# Patient Record
Sex: MALE | Race: WHITE | NOT HISPANIC OR LATINO | URBAN - NONMETROPOLITAN AREA
[De-identification: names, ages, dates, MRNs, and addresses within clinical notes are randomized per-mention and may not be internally consistent; named-entity substitution may affect disease eponyms.]

---

## 2017-02-06 ENCOUNTER — FOLLOW UP ESTABLISHED (OUTPATIENT)
Dept: URBAN - NONMETROPOLITAN AREA CLINIC 13 | Facility: CLINIC | Age: 67
End: 2017-02-06
Payer: COMMERCIAL

## 2017-02-06 DIAGNOSIS — H02.834 DERMATOCHALASIS OF LEFT UPPER LID: ICD-10-CM

## 2017-02-06 DIAGNOSIS — H40.1132 PRIMARY OPEN-ANGLE GLAUCOMA, MODERATE STAGE, BILATERAL: Primary | ICD-10-CM

## 2017-02-06 DIAGNOSIS — H16.223 KERATOCONJUNCT SICCA, NOT SPECIFIED AS SJOGREN'S, BILATERAL: ICD-10-CM

## 2017-02-06 DIAGNOSIS — H02.831 DERMATOCHALASIS OF RIGHT UPPER LID: ICD-10-CM

## 2017-02-06 PROCEDURE — 99213 OFFICE O/P EST LOW 20 MIN: CPT | Performed by: OPTOMETRIST

## 2017-02-06 PROCEDURE — 92134 CPTRZ OPH DX IMG PST SGM RTA: CPT | Performed by: OPTOMETRIST

## 2017-02-06 ASSESSMENT — INTRAOCULAR PRESSURE
OS: 11
OD: 12

## 2017-02-10 ENCOUNTER — FOLLOW UP ESTABLISHED (OUTPATIENT)
Dept: URBAN - NONMETROPOLITAN AREA CLINIC 13 | Facility: CLINIC | Age: 67
End: 2017-02-10
Payer: COMMERCIAL

## 2017-02-10 PROCEDURE — 99213 OFFICE O/P EST LOW 20 MIN: CPT | Performed by: OPTOMETRIST

## 2017-02-10 PROCEDURE — 92083 EXTENDED VISUAL FIELD XM: CPT | Performed by: OPTOMETRIST

## 2017-02-10 ASSESSMENT — INTRAOCULAR PRESSURE
OD: 13
OS: 13

## 2017-02-22 ENCOUNTER — FOLLOW UP ESTABLISHED (OUTPATIENT)
Dept: URBAN - NONMETROPOLITAN AREA CLINIC 13 | Facility: CLINIC | Age: 67
End: 2017-02-22
Payer: COMMERCIAL

## 2017-02-22 DIAGNOSIS — H35.372 PUCKERING OF MACULA, LEFT EYE: Primary | ICD-10-CM

## 2017-02-22 PROCEDURE — 92134 CPTRZ OPH DX IMG PST SGM RTA: CPT | Performed by: OPHTHALMOLOGY

## 2017-02-22 PROCEDURE — 92014 COMPRE OPH EXAM EST PT 1/>: CPT | Performed by: OPHTHALMOLOGY

## 2017-02-22 ASSESSMENT — INTRAOCULAR PRESSURE
OD: 12
OS: 13

## 2017-04-03 ENCOUNTER — Encounter (OUTPATIENT)
Dept: URBAN - NONMETROPOLITAN AREA CLINIC 13 | Facility: CLINIC | Age: 67
End: 2017-04-03
Payer: COMMERCIAL

## 2017-04-03 PROCEDURE — 99213 OFFICE O/P EST LOW 20 MIN: CPT | Performed by: PHYSICIAN ASSISTANT

## 2017-04-11 ENCOUNTER — SURGERY (OUTPATIENT)
Dept: URBAN - NONMETROPOLITAN AREA SURGERY 4 | Facility: SURGERY | Age: 67
End: 2017-04-11
Payer: COMMERCIAL

## 2017-04-11 PROCEDURE — 66820 INCISION SECONDARY CATARACT: CPT | Performed by: OPHTHALMOLOGY

## 2017-04-11 PROCEDURE — 67042 VIT FOR MACULAR HOLE: CPT | Performed by: OPHTHALMOLOGY

## 2017-04-11 RX ORDER — HYDROCODONE BITARTRATE AND ACETAMINOPHEN 5; 300 MG/1; MG/1
TABLET ORAL
Qty: 10 | Refills: 0 | Status: ACTIVE
Start: 2017-04-11

## 2017-04-11 RX ORDER — OFLOXACIN 3 MG/ML
0.3 % SOLUTION/ DROPS OPHTHALMIC
Qty: 1 | Refills: 0 | Status: INACTIVE
Start: 2017-04-11 | End: 2017-06-02

## 2017-04-11 RX ORDER — PREDNISOLONE ACETATE 10 MG/ML
1 % SUSPENSION/ DROPS OPHTHALMIC
Qty: 1 | Refills: 0 | Status: INACTIVE
Start: 2017-04-11 | End: 2017-06-02

## 2017-04-12 ENCOUNTER — FOLLOW UP ESTABLISHED (OUTPATIENT)
Dept: URBAN - NONMETROPOLITAN AREA CLINIC 13 | Facility: CLINIC | Age: 67
End: 2017-04-12

## 2017-04-12 PROCEDURE — 99024 POSTOP FOLLOW-UP VISIT: CPT | Performed by: OPTOMETRIST

## 2017-04-12 ASSESSMENT — INTRAOCULAR PRESSURE
OS: 15
OD: 10

## 2017-04-19 ENCOUNTER — POST OP (OUTPATIENT)
Dept: URBAN - NONMETROPOLITAN AREA CLINIC 13 | Facility: CLINIC | Age: 67
End: 2017-04-19
Payer: COMMERCIAL

## 2017-04-19 PROCEDURE — 92134 CPTRZ OPH DX IMG PST SGM RTA: CPT | Performed by: OPHTHALMOLOGY

## 2017-04-19 PROCEDURE — 99024 POSTOP FOLLOW-UP VISIT: CPT | Performed by: OPHTHALMOLOGY

## 2017-04-19 RX ORDER — LATANOPROST 50 UG/ML
0.005 % SOLUTION OPHTHALMIC
Qty: 1 | Refills: 6 | Status: INACTIVE
Start: 2017-04-19 | End: 2018-02-05

## 2017-04-19 ASSESSMENT — INTRAOCULAR PRESSURE
OS: 18
OD: 14

## 2017-06-02 ENCOUNTER — FOLLOW UP ESTABLISHED (OUTPATIENT)
Dept: URBAN - NONMETROPOLITAN AREA CLINIC 13 | Facility: CLINIC | Age: 67
End: 2017-06-02
Payer: COMMERCIAL

## 2017-06-02 PROCEDURE — 92083 EXTENDED VISUAL FIELD XM: CPT | Performed by: OPTOMETRIST

## 2017-06-02 PROCEDURE — 99213 OFFICE O/P EST LOW 20 MIN: CPT | Performed by: OPTOMETRIST

## 2017-06-02 PROCEDURE — 92133 CPTRZD OPH DX IMG PST SGM ON: CPT | Performed by: OPTOMETRIST

## 2017-06-28 ENCOUNTER — FOLLOW UP ESTABLISHED (OUTPATIENT)
Dept: URBAN - NONMETROPOLITAN AREA CLINIC 13 | Facility: CLINIC | Age: 67
End: 2017-06-28
Payer: COMMERCIAL

## 2017-06-28 PROCEDURE — 99024 POSTOP FOLLOW-UP VISIT: CPT | Performed by: OPHTHALMOLOGY

## 2017-06-28 PROCEDURE — 92134 CPTRZ OPH DX IMG PST SGM RTA: CPT | Performed by: OPHTHALMOLOGY

## 2017-06-28 ASSESSMENT — INTRAOCULAR PRESSURE
OD: 12
OS: 10

## 2017-07-17 ENCOUNTER — FOLLOW UP ESTABLISHED (OUTPATIENT)
Dept: URBAN - NONMETROPOLITAN AREA CLINIC 13 | Facility: CLINIC | Age: 67
End: 2017-07-17
Payer: COMMERCIAL

## 2017-07-17 DIAGNOSIS — H52.13 MYOPIA, BILATERAL: ICD-10-CM

## 2017-07-17 PROCEDURE — 92012 INTRM OPH EXAM EST PATIENT: CPT | Performed by: OPTOMETRIST

## 2017-07-17 PROCEDURE — 92015 DETERMINE REFRACTIVE STATE: CPT | Performed by: OPTOMETRIST

## 2017-07-17 ASSESSMENT — INTRAOCULAR PRESSURE
OS: 12
OD: 12

## 2017-07-17 ASSESSMENT — VISUAL ACUITY
OD: 20/20
OS: 20/40

## 2017-10-04 ENCOUNTER — FOLLOW UP ESTABLISHED (OUTPATIENT)
Dept: URBAN - NONMETROPOLITAN AREA CLINIC 13 | Facility: CLINIC | Age: 67
End: 2017-10-04
Payer: COMMERCIAL

## 2017-10-04 PROCEDURE — 92134 CPTRZ OPH DX IMG PST SGM RTA: CPT | Performed by: OPHTHALMOLOGY

## 2017-10-04 PROCEDURE — 92014 COMPRE OPH EXAM EST PT 1/>: CPT | Performed by: OPHTHALMOLOGY

## 2017-10-04 ASSESSMENT — INTRAOCULAR PRESSURE
OD: 16
OS: 17

## 2022-04-11 ENCOUNTER — OFFICE VISIT (OUTPATIENT)
Dept: URBAN - NONMETROPOLITAN AREA CLINIC 13 | Facility: CLINIC | Age: 72
End: 2022-04-11
Payer: MEDICARE

## 2022-04-11 DIAGNOSIS — H35.372 PUCKERING OF MACULA, LEFT EYE: Primary | ICD-10-CM

## 2022-04-11 DIAGNOSIS — H35.371 PUCKERING OF MACULA, RIGHT EYE: ICD-10-CM

## 2022-04-11 DIAGNOSIS — H04.123 TEAR FILM INSUFFICIENCY OF BILATERAL LACRIMAL GLANDS: ICD-10-CM

## 2022-04-11 DIAGNOSIS — H40.1132 PRIMARY OPEN-ANGLE GLAUCOMA, BILATERAL, MODERATE STAGE: ICD-10-CM

## 2022-04-11 PROCEDURE — 92004 COMPRE OPH EXAM NEW PT 1/>: CPT | Performed by: OPTOMETRIST

## 2022-04-11 PROCEDURE — 92133 CPTRZD OPH DX IMG PST SGM ON: CPT | Performed by: OPTOMETRIST

## 2022-04-11 PROCEDURE — 92134 CPTRZ OPH DX IMG PST SGM RTA: CPT | Performed by: OPTOMETRIST

## 2022-04-11 ASSESSMENT — INTRAOCULAR PRESSURE
OS: 15
OD: 18

## 2022-04-11 ASSESSMENT — VISUAL ACUITY
OS: 20/80
OD: 20/40

## 2022-04-11 NOTE — IMPRESSION/PLAN
Impression: Primary open-angle glaucoma, bilateral, moderate stage: Z17.0533. Plan: cpm: pt has had drops renewed by pain provider, ? if exacerbating os cme type conditions, will consult w/ retina on cme issue OS and pt to continue w/ latanoprost for now and will have pt return in 1 month for hvf 24-2 OU, disc photos, oct shows bdlr OD but abnormal OS, pt ed on today's findings.

## 2022-04-11 NOTE — IMPRESSION/PLAN
Impression: Puckering of macula, right eye: H35.371. Plan: looks to have early erm OD also limiting vision, pt ed on reason for od vision changes, needs monitor for now.

## 2022-04-11 NOTE — IMPRESSION/PLAN
Impression: Puckering of macula, left eye: H35.372. Plan: Monitor, pt is s/p membrane peel w/ worsening vision and no cystic spaces, ? if further tx necessary but will refer to retina for consult too see what options pt has to improve vision.

## 2022-05-09 ENCOUNTER — TESTING ONLY (OUTPATIENT)
Dept: URBAN - NONMETROPOLITAN AREA CLINIC 13 | Facility: CLINIC | Age: 72
End: 2022-05-09
Payer: MEDICARE

## 2022-05-09 DIAGNOSIS — H40.1132 PRIMARY OPEN-ANGLE GLAUCOMA, BILATERAL, MODERATE STAGE: Primary | ICD-10-CM

## 2022-05-09 DIAGNOSIS — H04.123 TEAR FILM INSUFFICIENCY OF BILATERAL LACRIMAL GLANDS: ICD-10-CM

## 2022-05-09 PROCEDURE — 92083 EXTENDED VISUAL FIELD XM: CPT | Performed by: OPTOMETRIST

## 2022-05-09 PROCEDURE — 92250 FUNDUS PHOTOGRAPHY W/I&R: CPT | Performed by: OPTOMETRIST

## 2022-05-09 PROCEDURE — 99213 OFFICE O/P EST LOW 20 MIN: CPT | Performed by: OPTOMETRIST

## 2022-05-09 ASSESSMENT — INTRAOCULAR PRESSURE
OS: 13
OD: 12

## 2022-05-09 NOTE — IMPRESSION/PLAN
Impression: Tear film insufficiency of bilateral lacrimal glands: H04.123.  Plan: start tears daily OU, and gels at night, recheck rx at next exam.

## 2022-05-09 NOTE — IMPRESSION/PLAN
Impression: Primary open-angle glaucoma, bilateral, moderate stage: M69.4619. Plan: cpm: latanoprost qhs OU, ? if having issues on swelling OS, pt has possible erm OD causing vision changes as well, hvf is stable and disc photos taken today w/ IOP in good place so monitor x 4 months w/ IOP check and oct ant seg geovanna/temp OU.

## 2022-05-18 ENCOUNTER — OFFICE VISIT (OUTPATIENT)
Dept: URBAN - NONMETROPOLITAN AREA CLINIC 13 | Facility: CLINIC | Age: 72
End: 2022-05-18
Payer: MEDICARE

## 2022-05-18 DIAGNOSIS — H35.372 PUCKERING OF MACULA, LEFT EYE: Primary | ICD-10-CM

## 2022-05-18 DIAGNOSIS — H35.072 RETINAL TELANGIECTASIS, LEFT EYE: ICD-10-CM

## 2022-05-18 PROCEDURE — 92235 FLUORESCEIN ANGRPH MLTIFRAME: CPT | Performed by: OPHTHALMOLOGY

## 2022-05-18 PROCEDURE — 92004 COMPRE OPH EXAM NEW PT 1/>: CPT | Performed by: OPHTHALMOLOGY

## 2022-05-18 PROCEDURE — 67028 INJECTION EYE DRUG: CPT | Performed by: OPHTHALMOLOGY

## 2022-05-18 PROCEDURE — 92134 CPTRZ OPH DX IMG PST SGM RTA: CPT | Performed by: OPHTHALMOLOGY

## 2022-05-18 ASSESSMENT — INTRAOCULAR PRESSURE
OS: 12
OD: 11

## 2022-05-18 NOTE — IMPRESSION/PLAN
Impression: Retinal telangiectasis, left eye: H35.072. Plan: The exam and OCT today show that the patient has a mild telangictasia with CME OS. The risks and benefits of an Avastin injection OS was discussed with the patient and an intravitreal Avastin was administered without complication OS. The patient will return to clinic in one month for a dilated follow up and possible oct. The patient notes he is pre-diabetic, If there is no success with the Avastin we will switch to Kenalog for his next injection.

## 2022-06-15 ENCOUNTER — OFFICE VISIT (OUTPATIENT)
Dept: URBAN - NONMETROPOLITAN AREA CLINIC 13 | Facility: CLINIC | Age: 72
End: 2022-06-15
Payer: MEDICARE

## 2022-06-15 DIAGNOSIS — H35.072 RETINAL TELANGIECTASIS, LEFT EYE: Primary | ICD-10-CM

## 2022-06-15 PROCEDURE — 92134 CPTRZ OPH DX IMG PST SGM RTA: CPT | Performed by: OPHTHALMOLOGY

## 2022-06-15 PROCEDURE — 67028 INJECTION EYE DRUG: CPT | Performed by: OPHTHALMOLOGY

## 2022-06-15 PROCEDURE — 92014 COMPRE OPH EXAM EST PT 1/>: CPT | Performed by: OPHTHALMOLOGY

## 2022-06-15 ASSESSMENT — INTRAOCULAR PRESSURE
OD: 19
OS: 17

## 2022-06-15 NOTE — IMPRESSION/PLAN
Impression: Retinal telangiectasis, left eye: H35.072. Plan: The exam and OCT today show that the patient has a mild telangictasia with CME OS. The risks and benefits of an Avastin injection OS was discussed with the patient and an intravitreal Avastin was administered without complication OS. The patient will return to clinic in one month for a repeat Avastin injection OS. The patient notes he is pre-diabetic, If there is no success with the Avastin we will switch to Kenalog for his next injection.

## 2022-07-13 ENCOUNTER — OFFICE VISIT (OUTPATIENT)
Dept: URBAN - NONMETROPOLITAN AREA CLINIC 13 | Facility: CLINIC | Age: 72
End: 2022-07-13
Payer: MEDICARE

## 2022-07-13 DIAGNOSIS — H35.072 RETINAL TELANGIECTASIS, LEFT EYE: Primary | ICD-10-CM

## 2022-07-13 PROCEDURE — 67028 INJECTION EYE DRUG: CPT | Performed by: OPHTHALMOLOGY

## 2022-07-13 ASSESSMENT — INTRAOCULAR PRESSURE
OS: 12
OD: 14

## 2022-07-13 NOTE — IMPRESSION/PLAN
Impression: Retinal telangiectasis, left eye: H35.072. Plan: an intravitreal Avastin was administered without complication OS. The patient will return to clinic in one month for a repeat Avastin injection OS.

## 2022-08-10 ENCOUNTER — OFFICE VISIT (OUTPATIENT)
Dept: URBAN - NONMETROPOLITAN AREA CLINIC 13 | Facility: CLINIC | Age: 72
End: 2022-08-10
Payer: MEDICARE

## 2022-08-10 DIAGNOSIS — H35.072 RETINAL TELANGIECTASIS, LEFT EYE: Primary | ICD-10-CM

## 2022-08-10 PROCEDURE — 67028 INJECTION EYE DRUG: CPT | Performed by: OPHTHALMOLOGY

## 2022-08-10 ASSESSMENT — INTRAOCULAR PRESSURE
OD: 15
OS: 17

## 2022-08-10 NOTE — IMPRESSION/PLAN
Impression: Retinal telangiectasis, left eye: H35.072. Plan: an intravitreal Avastin was administered without complication OS. The patient will return to clinic in one month for DFE and possible OCT.

## 2022-09-07 ENCOUNTER — OFFICE VISIT (OUTPATIENT)
Dept: URBAN - NONMETROPOLITAN AREA CLINIC 13 | Facility: CLINIC | Age: 72
End: 2022-09-07
Payer: MEDICARE

## 2022-09-07 DIAGNOSIS — H35.072 RETINAL TELANGIECTASIS, LEFT EYE: Primary | ICD-10-CM

## 2022-09-07 PROCEDURE — 92014 COMPRE OPH EXAM EST PT 1/>: CPT | Performed by: OPHTHALMOLOGY

## 2022-09-07 PROCEDURE — 67028 INJECTION EYE DRUG: CPT | Performed by: OPHTHALMOLOGY

## 2022-09-07 PROCEDURE — 92134 CPTRZ OPH DX IMG PST SGM RTA: CPT | Performed by: OPHTHALMOLOGY

## 2022-09-07 ASSESSMENT — INTRAOCULAR PRESSURE
OS: 15
OD: 15

## 2022-09-09 ENCOUNTER — OFFICE VISIT (OUTPATIENT)
Dept: URBAN - NONMETROPOLITAN AREA CLINIC 13 | Facility: CLINIC | Age: 72
End: 2022-09-09
Payer: MEDICARE

## 2022-09-09 DIAGNOSIS — H04.123 TEAR FILM INSUFFICIENCY OF BILATERAL LACRIMAL GLANDS: ICD-10-CM

## 2022-09-09 DIAGNOSIS — H40.1132 PRIMARY OPEN-ANGLE GLAUCOMA, BILATERAL, MODERATE STAGE: Primary | ICD-10-CM

## 2022-09-09 PROCEDURE — 99213 OFFICE O/P EST LOW 20 MIN: CPT | Performed by: OPTOMETRIST

## 2022-09-09 PROCEDURE — 92285 EXTERNAL OCULAR PHOTOGRAPHY: CPT | Performed by: OPTOMETRIST

## 2022-09-09 RX ORDER — LATANOPROST 50 UG/ML
0.005 % SOLUTION OPHTHALMIC
Qty: 5 | Refills: 12 | Status: ACTIVE
Start: 2022-09-09

## 2022-09-09 ASSESSMENT — INTRAOCULAR PRESSURE
OD: 14
OS: 14

## 2022-09-09 NOTE — IMPRESSION/PLAN
Impression: Tear film insufficiency of bilateral lacrimal glands: H04.123. Plan: continue w/  tears daily OU, and gels at night, will postpone rx for now  but recheck refraction at glaucoma visit too see if can improve vision somewhat OD.

## 2022-10-05 ENCOUNTER — PROCEDURE (OUTPATIENT)
Dept: URBAN - NONMETROPOLITAN AREA CLINIC 13 | Facility: CLINIC | Age: 72
End: 2022-10-05
Payer: MEDICARE

## 2022-10-05 DIAGNOSIS — H35.072 RETINAL TELANGIECTASIS, LEFT EYE: Primary | ICD-10-CM

## 2022-10-05 PROCEDURE — 67028 INJECTION EYE DRUG: CPT | Performed by: OPHTHALMOLOGY

## 2022-10-05 ASSESSMENT — INTRAOCULAR PRESSURE
OD: 18
OS: 16

## 2022-10-05 NOTE — IMPRESSION/PLAN
Impression: Retinal telangiectasis, left eye: H35.072. Plan: an intravitreal Avastin was administered without complication OS. The patient will return to clinic in 4-6 weeks for a repeat Avastin injection OS.

## 2022-11-02 ENCOUNTER — OFFICE VISIT (OUTPATIENT)
Dept: URBAN - NONMETROPOLITAN AREA CLINIC 13 | Facility: CLINIC | Age: 72
End: 2022-11-02
Payer: MEDICARE

## 2022-11-02 DIAGNOSIS — H35.072 RETINAL TELANGIECTASIS, LEFT EYE: Primary | ICD-10-CM

## 2022-11-02 PROCEDURE — 67028 INJECTION EYE DRUG: CPT | Performed by: OPHTHALMOLOGY

## 2022-11-02 ASSESSMENT — INTRAOCULAR PRESSURE
OD: 14
OS: 18

## 2022-11-02 NOTE — IMPRESSION/PLAN
Impression: Retinal telangiectasis, left eye: H35.072. Plan: an intravitreal Avastin was administered without complication OS. The patient will return to clinic in 4-6 weeks for a dilated follow up and possible oct.

## 2022-11-30 ENCOUNTER — OFFICE VISIT (OUTPATIENT)
Dept: URBAN - NONMETROPOLITAN AREA CLINIC 13 | Facility: CLINIC | Age: 72
End: 2022-11-30
Payer: MEDICARE

## 2022-11-30 DIAGNOSIS — H10.423 SIMPLE CHRONIC CONJUNCTIVITIS, BILATERAL: Primary | ICD-10-CM

## 2022-11-30 PROCEDURE — 99213 OFFICE O/P EST LOW 20 MIN: CPT | Performed by: OPTOMETRIST

## 2022-11-30 RX ORDER — ERYTHROMYCIN 5 MG/G
OINTMENT OPHTHALMIC
Qty: 3 | Refills: 0 | Status: ACTIVE
Start: 2022-11-30

## 2022-11-30 RX ORDER — DEXAMETHASONE SODIUM PHOSPHATE 1 MG/ML
0.1 % SOLUTION/ DROPS OPHTHALMIC
Qty: 5 | Refills: 0 | Status: ACTIVE
Start: 2022-11-30

## 2022-11-30 ASSESSMENT — INTRAOCULAR PRESSURE
OD: 18
OS: 14

## 2022-11-30 NOTE — IMPRESSION/PLAN
Impression: Simple chronic conjunctivitis, bilateral: H10.423. Plan: monitor, pt has filamentray keratitis, OU, start pf tears q2H, dex gtts tid OU and ees ashleigh atnight, taper latanoprost and monitor x 1 week w/ ant seg exam and IO Pcheck, pt ed on s/s of worsening issue rtc asap.

## 2022-12-07 ENCOUNTER — OFFICE VISIT (OUTPATIENT)
Dept: URBAN - NONMETROPOLITAN AREA CLINIC 13 | Facility: CLINIC | Age: 72
End: 2022-12-07
Payer: MEDICARE

## 2022-12-07 DIAGNOSIS — H40.1132 PRIMARY OPEN-ANGLE GLAUCOMA, BILATERAL, MODERATE STAGE: ICD-10-CM

## 2022-12-07 DIAGNOSIS — H10.423 SIMPLE CHRONIC CONJUNCTIVITIS, BILATERAL: Primary | ICD-10-CM

## 2022-12-07 DIAGNOSIS — H35.072 RETINAL TELANGIECTASIS, LEFT EYE: ICD-10-CM

## 2022-12-07 PROCEDURE — 92134 CPTRZ OPH DX IMG PST SGM RTA: CPT | Performed by: OPHTHALMOLOGY

## 2022-12-07 PROCEDURE — 92012 INTRM OPH EXAM EST PATIENT: CPT | Performed by: OPHTHALMOLOGY

## 2022-12-07 PROCEDURE — 67028 INJECTION EYE DRUG: CPT | Performed by: OPHTHALMOLOGY

## 2022-12-07 PROCEDURE — 99213 OFFICE O/P EST LOW 20 MIN: CPT | Performed by: OPTOMETRIST

## 2022-12-07 ASSESSMENT — INTRAOCULAR PRESSURE
OS: 22
OS: 22
OD: 21
OD: 21

## 2022-12-07 NOTE — IMPRESSION/PLAN
Impression: Simple chronic conjunctivitis, bilateral: H10.423.  Plan: monitor, pt has filamentray keratitis, OU, improving taper off tears 4x daily dex gtts bid for 1 week then qam for 1 week use ees ashleigh at [facility] till gone, needs monitor x 2 weeks to recheck IOP OU

## 2022-12-07 NOTE — IMPRESSION/PLAN
Impression: Retinal telangiectasis, left eye: H35.072. Plan: The exam and OCT today show that the patient has a mild telangictasia with CME OS. The risks and benefits of an Avastin injection OS was discussed with the patient and an intravitreal Avastin was administered without complication OS. The patient will return to clinic in one month for a repeat Avastin injection OS.

## 2022-12-07 NOTE — IMPRESSION/PLAN
Impression: Primary open-angle glaucoma, bilateral, moderate stage: U02.4489. Plan: cpm: restart latanoprost qhs OU, IOP going up needs too keep f/up w/ retina today and IOP check in 2 weeks.

## 2022-12-21 ENCOUNTER — OFFICE VISIT (OUTPATIENT)
Dept: URBAN - NONMETROPOLITAN AREA CLINIC 13 | Facility: CLINIC | Age: 72
End: 2022-12-21
Payer: MEDICARE

## 2022-12-21 DIAGNOSIS — H40.1132 PRIMARY OPEN-ANGLE GLAUCOMA, BILATERAL, MODERATE STAGE: Primary | ICD-10-CM

## 2022-12-21 PROCEDURE — 99213 OFFICE O/P EST LOW 20 MIN: CPT | Performed by: OPTOMETRIST

## 2022-12-21 ASSESSMENT — INTRAOCULAR PRESSURE
OS: 17
OD: 14

## 2022-12-21 NOTE — IMPRESSION/PLAN
Impression: Primary open-angle glaucoma, bilateral, moderate stage: H04.6567.  Plan: cpm: latanoprost qhs OU, IOP improved OU, needs keep exam as scheduled for cee w/ dfe and optos OU

## 2023-01-18 ENCOUNTER — PROCEDURE (OUTPATIENT)
Dept: URBAN - NONMETROPOLITAN AREA CLINIC 13 | Facility: CLINIC | Age: 73
End: 2023-01-18
Payer: MEDICARE

## 2023-01-18 DIAGNOSIS — H35.372 PUCKERING OF MACULA, LEFT EYE: ICD-10-CM

## 2023-01-18 DIAGNOSIS — H35.072 RETINAL TELANGIECTASIS, LEFT EYE: Primary | ICD-10-CM

## 2023-01-18 PROCEDURE — 99214 OFFICE O/P EST MOD 30 MIN: CPT | Performed by: OPHTHALMOLOGY

## 2023-01-18 PROCEDURE — 92134 CPTRZ OPH DX IMG PST SGM RTA: CPT | Performed by: OPHTHALMOLOGY

## 2023-01-18 ASSESSMENT — INTRAOCULAR PRESSURE: OS: 13

## 2023-01-18 NOTE — IMPRESSION/PLAN
Impression: Retinal telangiectasis, left eye: H35.072. Plan: HOLD injection of anti-VEGF therapy today. Last FA was in 5/2022 w/out appreciable pooling at [facility] nor leakage consistent w/ NV. Observe closely, RTC 6 weeks.

## 2023-02-06 ENCOUNTER — OFFICE VISIT (OUTPATIENT)
Dept: URBAN - NONMETROPOLITAN AREA CLINIC 13 | Facility: CLINIC | Age: 73
End: 2023-02-06
Payer: MEDICARE

## 2023-02-06 DIAGNOSIS — H40.1132 PRIMARY OPEN-ANGLE GLAUCOMA, BILATERAL, MODERATE STAGE: Primary | ICD-10-CM

## 2023-02-06 DIAGNOSIS — H35.072 RETINAL TELANGIECTASIS, LEFT EYE: ICD-10-CM

## 2023-02-06 DIAGNOSIS — Z96.1 PRESENCE OF INTRAOCULAR LENS: ICD-10-CM

## 2023-02-06 DIAGNOSIS — H35.372 PUCKERING OF MACULA, LEFT EYE: ICD-10-CM

## 2023-02-06 DIAGNOSIS — H04.123 TEAR FILM INSUFFICIENCY OF BILATERAL LACRIMAL GLANDS: ICD-10-CM

## 2023-02-06 PROCEDURE — 92250 FUNDUS PHOTOGRAPHY W/I&R: CPT | Performed by: OPTOMETRIST

## 2023-02-06 PROCEDURE — 92014 COMPRE OPH EXAM EST PT 1/>: CPT | Performed by: OPTOMETRIST

## 2023-02-06 RX ORDER — AMLODIPINE BESYLATE 5 MG/1
5 MG TABLET ORAL
Qty: 0 | Refills: 0 | Status: ACTIVE
Start: 2023-02-06

## 2023-02-06 RX ORDER — IBUPROFEN 200 MG/1
200 MG TABLET, COATED ORAL
Qty: 0 | Refills: 0 | Status: ACTIVE
Start: 2023-02-06

## 2023-02-06 RX ORDER — HYDROCODONE BITARTRATE AND ACETAMINOPHEN 5; 300 MG/1; MG/1
TABLET ORAL
Qty: 10 | Refills: 0 | Status: ACTIVE
Start: 2023-02-06

## 2023-02-06 RX ORDER — SIMVASTATIN 20 MG/1
20 MG TABLET, FILM COATED ORAL
Qty: 0 | Refills: 0 | Status: ACTIVE
Start: 2023-02-06

## 2023-02-06 RX ORDER — LATANOPROST 50 UG/ML
0.005 % SOLUTION OPHTHALMIC
Qty: 5 | Refills: 12 | Status: ACTIVE
Start: 2023-02-06

## 2023-02-06 ASSESSMENT — INTRAOCULAR PRESSURE
OD: 21
OS: 13

## 2023-02-06 ASSESSMENT — VISUAL ACUITY
OD: 20/40
OS: 20/150

## 2023-02-06 NOTE — IMPRESSION/PLAN
Impression: Puckering of macula, left eye: H35.372. Plan: see above plan: s/p PPV/FMP, stable. Observe.

## 2023-02-06 NOTE — IMPRESSION/PLAN
Impression: Retinal telangiectasis, left eye: H35.072.  Plan: looks like swelling is getting worse so pt to return to retina for f/up and further eval and tx, if gets even worse rtc asap for eval.

## 2023-02-06 NOTE — IMPRESSION/PLAN
Impression: Primary open-angle glaucoma, bilateral, moderate stage: M63.7671. Plan: cpm: latanoprost qhs OU, IOP remains improved OS but elevated OD, pt notes has not been as compliant lately as has been using gel at night, will have pt improve compliance and instill w/ punctal occlusion, want to recheck IOP in 2 months w/ same day HVF 24-2 OU as disc photos taken today.

## 2023-02-06 NOTE — IMPRESSION/PLAN
Impression: Tear film insufficiency of bilateral lacrimal glands: H04.123. Plan: continue w/  tears daily OU, and gels at night as directed, emphasized needs dry eye therapy and glaucoma therapy.

## 2023-03-11 ENCOUNTER — OFFICE VISIT (OUTPATIENT)
Dept: URBAN - NONMETROPOLITAN AREA CLINIC 13 | Facility: CLINIC | Age: 73
End: 2023-03-11
Payer: MEDICARE

## 2023-03-11 DIAGNOSIS — H35.372 PUCKERING OF MACULA, LEFT EYE: ICD-10-CM

## 2023-03-11 DIAGNOSIS — H35.072 RETINAL TELANGIECTASIS, LEFT EYE: Primary | ICD-10-CM

## 2023-03-11 PROCEDURE — 99214 OFFICE O/P EST MOD 30 MIN: CPT | Performed by: OPHTHALMOLOGY

## 2023-03-11 PROCEDURE — 92134 CPTRZ OPH DX IMG PST SGM RTA: CPT | Performed by: OPHTHALMOLOGY

## 2023-03-11 ASSESSMENT — INTRAOCULAR PRESSURE
OD: 16
OS: 16

## 2023-03-11 NOTE — IMPRESSION/PLAN
Impression: Retinal telangiectasis, left eye: H35.072. Plan: HOLD injection of anti-VEGF therapy today. Stable-appearing OCT. RTC 3 months and check FA transit OS.

## 2023-04-07 ENCOUNTER — OFFICE VISIT (OUTPATIENT)
Dept: URBAN - NONMETROPOLITAN AREA CLINIC 13 | Facility: CLINIC | Age: 73
End: 2023-04-07
Payer: MEDICARE

## 2023-04-07 DIAGNOSIS — H40.1132 PRIMARY OPEN-ANGLE GLAUCOMA, BILATERAL, MODERATE STAGE: Primary | ICD-10-CM

## 2023-04-07 DIAGNOSIS — H52.223 REGULAR ASTIGMATISM, BILATERAL: ICD-10-CM

## 2023-04-07 DIAGNOSIS — H04.123 DRY EYE SYNDROME OF BILATERAL LACRIMAL GLANDS: ICD-10-CM

## 2023-04-07 DIAGNOSIS — H35.372 PUCKERING OF MACULA, LEFT EYE: ICD-10-CM

## 2023-04-07 PROCEDURE — 99213 OFFICE O/P EST LOW 20 MIN: CPT | Performed by: OPTOMETRIST

## 2023-04-07 PROCEDURE — 92250 FUNDUS PHOTOGRAPHY W/I&R: CPT | Performed by: OPTOMETRIST

## 2023-04-07 PROCEDURE — 92083 EXTENDED VISUAL FIELD XM: CPT | Performed by: OPTOMETRIST

## 2023-04-07 ASSESSMENT — VISUAL ACUITY
OD: 20/30
OS: 20/60

## 2023-04-07 ASSESSMENT — INTRAOCULAR PRESSURE
OD: 15
OS: 15

## 2023-04-07 NOTE — IMPRESSION/PLAN
Impression: Primary open-angle glaucoma, bilateral, moderate stage: L52.8277. Plan: cpm: latanoprost qhs OU, IOP remains improved OU, , pt note improved compliance, testing w/ hvf and photos are stable today OU, needs monitor x 4 months for IOP check and oct rnfl OU.

## 2023-06-07 ENCOUNTER — OFFICE VISIT (OUTPATIENT)
Dept: URBAN - NONMETROPOLITAN AREA CLINIC 13 | Facility: CLINIC | Age: 73
End: 2023-06-07
Payer: MEDICARE

## 2023-06-07 DIAGNOSIS — H35.072 RETINAL TELANGIECTASIS, LEFT EYE: Primary | ICD-10-CM

## 2023-06-07 PROCEDURE — 99214 OFFICE O/P EST MOD 30 MIN: CPT | Performed by: OPHTHALMOLOGY

## 2023-06-07 PROCEDURE — 92134 CPTRZ OPH DX IMG PST SGM RTA: CPT | Performed by: OPHTHALMOLOGY

## 2023-06-07 PROCEDURE — 92235 FLUORESCEIN ANGRPH MLTIFRAME: CPT | Performed by: OPHTHALMOLOGY

## 2023-06-07 ASSESSMENT — INTRAOCULAR PRESSURE
OD: 15
OS: 15

## 2023-06-07 NOTE — IMPRESSION/PLAN
Impression: Retinal telangiectasis, left eye: H35.072. Plan: CONTINUE TO HOLD anti-VEGF injections, stable-appearing FA and OCT today. RTC 3-4 months DFEx/OCT mac/FP/poss ANH OS.

## 2025-02-28 ENCOUNTER — OFFICE VISIT (OUTPATIENT)
Dept: URBAN - NONMETROPOLITAN AREA CLINIC 13 | Facility: CLINIC | Age: 75
End: 2025-02-28
Payer: COMMERCIAL

## 2025-02-28 DIAGNOSIS — H52.223 REGULAR ASTIGMATISM, BILATERAL: ICD-10-CM

## 2025-02-28 DIAGNOSIS — Z96.1 PRESENCE OF INTRAOCULAR LENS: ICD-10-CM

## 2025-02-28 DIAGNOSIS — H35.072 RETINAL TELANGIECTASIS, LEFT EYE: ICD-10-CM

## 2025-02-28 DIAGNOSIS — H40.1132 PRIMARY OPEN-ANGLE GLAUCOMA, BILATERAL, MODERATE STAGE: Primary | ICD-10-CM

## 2025-02-28 DIAGNOSIS — H04.123 TEAR FILM INSUFFICIENCY OF BILATERAL LACRIMAL GLANDS: ICD-10-CM

## 2025-02-28 PROCEDURE — 92134 CPTRZ OPH DX IMG PST SGM RTA: CPT | Performed by: OPTOMETRIST

## 2025-02-28 PROCEDURE — 92014 COMPRE OPH EXAM EST PT 1/>: CPT | Performed by: OPTOMETRIST

## 2025-02-28 PROCEDURE — 92133 CPTRZD OPH DX IMG PST SGM ON: CPT | Performed by: OPTOMETRIST

## 2025-02-28 ASSESSMENT — VISUAL ACUITY
OD: 20/25
OS: 20/80

## 2025-02-28 ASSESSMENT — INTRAOCULAR PRESSURE
OD: 18
OS: 18

## 2025-04-14 ENCOUNTER — OFFICE VISIT (OUTPATIENT)
Dept: URBAN - NONMETROPOLITAN AREA CLINIC 13 | Facility: CLINIC | Age: 75
End: 2025-04-14
Payer: COMMERCIAL

## 2025-04-14 DIAGNOSIS — H40.1132 PRIMARY OPEN-ANGLE GLAUCOMA, BILATERAL, MODERATE STAGE: Primary | ICD-10-CM

## 2025-04-14 DIAGNOSIS — Z96.1 PRESENCE OF INTRAOCULAR LENS: ICD-10-CM

## 2025-04-14 PROCEDURE — 92250 FUNDUS PHOTOGRAPHY W/I&R: CPT | Performed by: OPTOMETRIST

## 2025-04-14 PROCEDURE — 92083 EXTENDED VISUAL FIELD XM: CPT | Performed by: OPTOMETRIST

## 2025-04-14 PROCEDURE — 99213 OFFICE O/P EST LOW 20 MIN: CPT | Performed by: OPTOMETRIST

## 2025-04-14 ASSESSMENT — INTRAOCULAR PRESSURE
OD: 16
OS: 15

## 2025-05-09 ENCOUNTER — OFFICE VISIT (OUTPATIENT)
Dept: URBAN - NONMETROPOLITAN AREA CLINIC 13 | Facility: CLINIC | Age: 75
End: 2025-05-09
Payer: COMMERCIAL

## 2025-05-09 DIAGNOSIS — H59.032 CYSTOID MACULAR EDEMA FOLLOWING CATARACT SURGERY, LEFT EYE: ICD-10-CM

## 2025-05-09 DIAGNOSIS — H35.072 RETINAL TELANGIECTASIS, LEFT EYE: Primary | ICD-10-CM

## 2025-05-09 PROCEDURE — 92134 CPTRZ OPH DX IMG PST SGM RTA: CPT | Performed by: OPHTHALMOLOGY

## 2025-05-09 PROCEDURE — 99214 OFFICE O/P EST MOD 30 MIN: CPT | Performed by: OPHTHALMOLOGY

## 2025-05-09 ASSESSMENT — INTRAOCULAR PRESSURE
OS: 20
OD: 19

## 2025-05-21 ENCOUNTER — OFFICE VISIT (OUTPATIENT)
Dept: URBAN - NONMETROPOLITAN AREA CLINIC 13 | Facility: CLINIC | Age: 75
End: 2025-05-21
Payer: COMMERCIAL

## 2025-05-21 DIAGNOSIS — H59.032 CYSTOID MACULAR EDEMA FOLLOWING CATARACT SURGERY, LEFT EYE: Primary | ICD-10-CM

## 2025-05-21 DIAGNOSIS — H35.072 RETINAL TELANGIECTASIS, LEFT EYE: ICD-10-CM

## 2025-05-21 PROCEDURE — 67516 SPRCHOROIDAL SPC NJX RX AGT: CPT | Performed by: OPHTHALMOLOGY

## 2025-05-21 ASSESSMENT — INTRAOCULAR PRESSURE
OD: 16
OS: 21

## 2025-08-01 ENCOUNTER — OFFICE VISIT (OUTPATIENT)
Dept: URBAN - NONMETROPOLITAN AREA CLINIC 13 | Facility: CLINIC | Age: 75
End: 2025-08-01
Payer: COMMERCIAL

## 2025-08-01 DIAGNOSIS — H59.032 CYSTOID MACULAR EDEMA FOLLOWING CATARACT SURGERY, LEFT EYE: Primary | ICD-10-CM

## 2025-08-01 DIAGNOSIS — H35.072 RETINAL TELANGIECTASIS, LEFT EYE: ICD-10-CM

## 2025-08-01 PROCEDURE — 92134 CPTRZ OPH DX IMG PST SGM RTA: CPT | Performed by: OPHTHALMOLOGY

## 2025-08-01 PROCEDURE — 99214 OFFICE O/P EST MOD 30 MIN: CPT | Performed by: OPHTHALMOLOGY

## 2025-08-01 ASSESSMENT — INTRAOCULAR PRESSURE
OD: 18
OS: 29

## 2025-08-18 ENCOUNTER — OFFICE VISIT (OUTPATIENT)
Dept: URBAN - NONMETROPOLITAN AREA CLINIC 13 | Facility: CLINIC | Age: 75
End: 2025-08-18
Payer: COMMERCIAL

## 2025-08-18 DIAGNOSIS — H40.1132 PRIMARY OPEN-ANGLE GLAUCOMA, BILATERAL, MODERATE STAGE: Primary | ICD-10-CM

## 2025-08-18 PROCEDURE — 99213 OFFICE O/P EST LOW 20 MIN: CPT | Performed by: OPTOMETRIST

## 2025-08-18 PROCEDURE — 92134 CPTRZ OPH DX IMG PST SGM RTA: CPT | Performed by: OPTOMETRIST

## 2025-08-18 RX ORDER — LATANOPROST 50 UG/ML
0.005 % SOLUTION/ DROPS OPHTHALMIC
Qty: 6 | Refills: 11 | Status: ACTIVE
Start: 2025-08-18

## 2025-08-18 ASSESSMENT — INTRAOCULAR PRESSURE
OS: 18
OD: 13